# Patient Record
Sex: FEMALE | Race: AMERICAN INDIAN OR ALASKA NATIVE
[De-identification: names, ages, dates, MRNs, and addresses within clinical notes are randomized per-mention and may not be internally consistent; named-entity substitution may affect disease eponyms.]

---

## 2018-11-06 ENCOUNTER — HOSPITAL ENCOUNTER (OUTPATIENT)
Dept: HOSPITAL 46 - OPS | Age: 58
Discharge: HOME | End: 2018-11-06
Attending: SURGERY
Payer: COMMERCIAL

## 2018-11-06 VITALS — HEIGHT: 62 IN | WEIGHT: 164 LBS | BODY MASS INDEX: 30.18 KG/M2

## 2018-11-06 DIAGNOSIS — Z88.8: ICD-10-CM

## 2018-11-06 DIAGNOSIS — E11.9: ICD-10-CM

## 2018-11-06 DIAGNOSIS — J45.909: ICD-10-CM

## 2018-11-06 DIAGNOSIS — Z88.5: ICD-10-CM

## 2018-11-06 DIAGNOSIS — E66.3: ICD-10-CM

## 2018-11-06 DIAGNOSIS — K57.30: ICD-10-CM

## 2018-11-06 DIAGNOSIS — K62.5: Primary | ICD-10-CM

## 2018-11-06 DIAGNOSIS — Z91.041: ICD-10-CM

## 2018-11-06 DIAGNOSIS — E03.9: ICD-10-CM

## 2018-11-06 DIAGNOSIS — Z88.0: ICD-10-CM

## 2018-11-06 DIAGNOSIS — Z90.49: ICD-10-CM

## 2018-11-06 DIAGNOSIS — K21.9: ICD-10-CM

## 2018-11-06 PROCEDURE — 0DJD8ZZ INSPECTION OF LOWER INTESTINAL TRACT, VIA NATURAL OR ARTIFICIAL OPENING ENDOSCOPIC: ICD-10-PCS | Performed by: SURGERY

## 2018-11-06 PROCEDURE — G0500 MOD SEDAT ENDO SERVICE >5YRS: HCPCS

## 2018-11-06 NOTE — NUR
11/06/18 1421 Cheryl Mercer
1416-PATIENT ARRIVED TO PACU ON 2L NC O2 % PATIENT REACTIVE TO
VOICE DROWSY DENIES PAIN OR NAUSEA. ABDOMEN SOFT. LAYING LEFT LATERAL.
GLUCOSE CHECK 89

## 2018-11-07 NOTE — OR
[facility]
                                    [address], [place]  [address]
_________________________________________________________________________________________
                                                                 Signed   
 
 
DATE OF OPERATION:
11/06/2018
 
SURGEON:
Tk Larry MD
 
PREOPERATIVE DIAGNOSES:
1. Episodic rectal bleeding.
2. History of sigmoid resection for diverticular disease in 2011.
 
POSTOPERATIVE DIAGNOSES:
1. Scattered diverticula of remaining colon.  No evidence of anastomotic abnormality
including stricture. 
2. No active proctitis or obvious hemorrhoidal changes.
 
PROCEDURE:
Total colonoscopy to cecum.
 
ANESTHESIA:
Intravenous sedation, fentanyl 100 mcg, Versed 5 mg.
 
INDICATION:
This 58-year-old  woman has complaints of rectal bleeding.  She has last had
bleeding in May.  Blood filled the toilet bowl upon defecation.  She had no pain with
defecation.  She has undergone sigmoid resection for diverticular disease by me in 2011.
 She has some evidence of episodic abdominal pain as well.  She is admitted at this time
to undergo colonoscopy to better characterize her problem, understanding the risks of
bleeding, infection, and perforation. 
 
FINDINGS:
The prep was excellent.  Complete colonoscopy was undertaken of the cecum.  There were
few scattered diverticula in the remaining colon.  The rectum was normal.  The
anastomosis was widely patent.  There was no sign of active bleeding.  No sign of
proctitis or obvious hemorrhoidal changes. 
 
DESCRIPTION OF PROCEDURE:
The patient was brought to the endoscopy suite and placed in lateral decubitus position
given intravenous sedation to the point of slurred speech and nystagmus.  Digital rectal
examination was normal. 
 
An Olympus video colonoscope was passed in the rectum and manipulated throughout the
colon ultimately intubating the cecum.  The ileocecal valve and appendiceal orifice were
 
    Electronically Signed By: TK LARRY MD  11/07/18 0849
_________________________________________________________________________________________
PATIENT NAME:     JAMES VALENZUELA                 
MEDICAL RECORD #: [id number]            OPERATIVE REPORT              
          ACCT #: [id number]  
DATE OF BIRTH:   [birth year]            REPORT #: [id number]      
PHYSICIAN:        TK LARRY MD                 
PCP:              JOHN FERNANDO MD               
REPORT IS CONFIDENTIAL AND NOT TO BE RELEASED WITHOUT AUTHORIZATION
 
 
                                  [facility]
                                    [address], [place]  [address]
_________________________________________________________________________________________
                                                                 Signed   
 
 
normal.  Scope was withdrawn from that point.  Examination throughout showed no sign of
abnormality other than a few scattered diverticula throughout the colon.  The
coloproctostomy (anastomosis) was widely patent without sign of pathologic abnormality.
The rectum was normal.  Retroflexed view was undertaken showing no active bleeding or
obvious colitis.  No sign of significant hemorrhoidal change at this time.  Scope was
removed and the patient was taken to the recovery room in good condition. 
 
CONCLUDING DIAGNOSIS:
Uncertain etiology of bleeding probably hemorrhoidal, now passed. 
 
Recommend high-fiber diet or Citrucel 1 tablespoon daily.  She will call if bleeding
recurs. 
 
 
 
            ________________________________________
            MD GADIEL Mueller/SENGL
Job #:  [id number]
DD:  11/06/2018 14:22:21
DT:  11/06/2018 20:44:59
 
cc:            John Fernando MD
 
 
Copies:  JOHN FERNANDO MD
~
 
 
 
 
 
 
 
 
 
 
 
 
 
 
    Electronically Signed By: TK LARRY MD  11/07/18 0849
_________________________________________________________________________________________
PATIENT NAME:     JAMES VALENZUELA                 
MEDICAL RECORD #: [id number]            OPERATIVE REPORT              
          ACCT #: [id number]  
DATE OF BIRTH:   [birth year]            REPORT #: [id number]      
PHYSICIAN:        TK LARRY MD                 
PCP:              JOHN FERNANDO MD               
REPORT IS CONFIDENTIAL AND NOT TO BE RELEASED WITHOUT AUTHORIZATION

## 2021-06-25 NOTE — NUR
06/25/21 1015 Lesvia Louise
1012 PATIENT ARRIVES TO PACU RESTING WITH EYES CLOSED. ANSWERS
QUESTIONS APPROPRIATELY WITH VERBAL STIMULI. RESP EVEN AND
UNLABORED,OXYGEN OFF, ROOM AIR SATS >95%. DENIES PAIN OR NAUSEA.

## 2021-06-26 NOTE — OR
[facility]
                                    [address], [place]  [address]
_________________________________________________________________________________________
                                                                 Signed   
 
 
DATE OF OPERATION:
06/25/2021
 
SURGEON:
Tk Larry MD
 
PREOPERATIVE DIAGNOSES:
1. Abdominal bloating, fullness, longstanding gastroesophageal reflux history.
2. Diabetes mellitus.
3. History of diverticular disease, requiring resection.
 
POSTOPERATIVE DIAGNOSES:
1. Upper endoscopy showing normal esophagus, mild gastritis, no sign of ulceration or
neoplasm.  No hiatal hernia. 
2. Normal colon to cecum, widely patent colorectal anastomosis.
 
PROCEDURES:
1. Esophagogastroduodenoscopy with biopsy.
2. Total colonoscopy to cecum with biopsy of cecum and rectum.
 
ANESTHESIA:
Intravenous sedation, fentanyl 150 mcg and Versed 5 mg.
 
INDICATION:
This 61-year-old [place] woman is a patient of Dr. Cheney, [facility].  She is
known to me from the past having undergone sigmoid resection for diverticular disease.
She is seen recently with complaints of abdominal bloating and fullness and so on.  She
does not have typical biliary symptoms of epigastric right subcostal pain.  Her blood
sugars have been somewhat poorly controlled in recent times as well.  Clinical suspicion
remains high for possible biliary disease, a recent ct scan confirms gallstones. 
  She has also had lower abdominal pain, suggestive of recurrent diverticular
disease and upper abdominal pain in a bandlike configuration, which is uncertain
etiology.  Most dominantly, she has vague abdominal bloating and fullness.  She is
admitted at this time to undergo upper endoscopy and colonoscopy to better characterize
the problem, I remain highly suspect biliary source for symptoms. 
 
FINDINGS:
Upper endoscopy showed a normal esophagus.  This flap valve was good.  The stomach had
mild inflammation, but not much and no ulceration.  CLOtest was negative indicating no
evidence of H. pylori.  The duodenum was normal. 
 
Colonoscopy showed an adequate prep.  Complete colonoscopy was undertaken of the cecum.
 
    Electronically Signed By: TK LARRY MD  06/26/21 1149
_________________________________________________________________________________________
PATIENT NAME:     JAMES VALENZUELA                 
MEDICAL RECORD #: [id number]            OPERATIVE REPORT              
          ACCT #: [id number]  
DATE OF BIRTH:   [birth year]            REPORT #: [id number]      
PHYSICIAN:        TK LARRY MD                 
PCP:              ANDRES CHENEY MD                
REPORT IS CONFIDENTIAL AND NOT TO BE RELEASED WITHOUT AUTHORIZATION
 
 
                                  [facility]
                                    [address], [place]  [address]
_________________________________________________________________________________________
                                                                 Signed   
 
 
There were no gross findings of note.  The anastomosis was widely patent.  Biopsies were
taken of the cecum and rectum to assess for occult inflammation. 
 
DESCRIPTION OF PROCEDURE:
The patient was brought to the endoscopy suite, given topical hypopharyngeal lidocaine
anesthetic spray.  In the lateral decubitus position with full cardiopulmonary
monitoring.  A bite block was placed and she was given intravenous sedation to the point
of slurred speech and nystagmus.  The Olympus video upper endoscope was passed by
hypopharynx, vocal cords appeared normal.  Scope was easily passed in the esophagus
throughout its length, it was entirely normal.  Scope was passed to the stomach, which
was insufflated with air, there was no evidence of fluid retention or other problem,
there was no bile in the stomach, rugal folds were normal, pylorus was normal.  Scope
was passed through into the duodenum, which was normal; biopsies were taken of the 2nd
bulbar portions to assess for celiac disease and other occult inflammation.  The scope
was withdrawn.  A biopsy was then taken of the antrum for both JOE and pathologic
testing.  Notably, there was a fine reticular appearance of mild chronic gastritis.
Proximal evaluation on retroflexed view showed a reasonably good flap valve actually.
The scope was withdrawn to the distal esophagus and biopsies were obtained there.  The
mucosa was normal.  Scope was further withdrawn.  There were no other findings. 
 
Plans were then made for colonoscopy.  Additional sedation was given and digital rectal
examination was performed, which was normal.  An Olympus video colonoscope was passed in
the rectum and manipulated throughout the colon.  The prep was adequate, but not great
by any means.  There was no solid stool.  Fair amount of irrigation was required.  Scope
was ultimately advanced to the cecum.  The ileocecal valve and appendiceal orifice were
normal.  Scope was manipulated to allow for biopsy of the cecum.  Withdrawal of scope
showed no sign of abnormality throughout the entire colon.  The anastomosis which was
under site and configuration was widely patent.  Biopsies were then taken of the rectum,
but appeared normal.  The scope was removed.  The patient was taken to the recovery room
in good condition.  Notably, she did have internal hemorrhoidal changes. 
 
CONCLUDING DIAGNOSIS:
No findings likely to account for her current symptoms.  I strongly suspect a biliary
source to her problem. A Ct scan has already confirmed gallstones. I have scheduled her
for lap clarissa, possible open, this coming Tuesday-- barring significant findings on this
endoscopic evaluation. We Should continue with the opertive plan on that bais.
 
PLAN:
She has already been scheduled for lap clarissa, possible open procedure this coming
Tuesday and since there were no findings to account for her symptoms of upper abdominal
pain, we will procedure with lap clarissa. 
 
 
    Electronically Signed By: TK LARRY MD  06/26/21 1149
_________________________________________________________________________________________
PATIENT NAME:     JAMES VALENZUELA                 
MEDICAL RECORD #: [id number]            OPERATIVE REPORT              
          ACCT #: [id number]  
DATE OF BIRTH:   [birth year]            REPORT #: [id number]      
PHYSICIAN:        TK LARRY MD                 
PCP:              ANDRES CHENEY MD                
REPORT IS CONFIDENTIAL AND NOT TO BE RELEASED WITHOUT AUTHORIZATION
 
 
                                  [facility]
                                    [address] [place], [place]  [address]
_________________________________________________________________________________________
                                                                 Signed   
 
 
 
 
            ________________________________________
            MD GADIEL Mueller/TANIYA
Job #:  [id number]
DD:  06/25/2021 10:20:18
DT:  06/25/2021 17:37:33
 
cc:            Andres Cheney MD
 
 
Copies:  ANDRES CHENEY MD
~
 
 
 
 
 
 
 
 
 
 
 
 
 
 
 
 
 
 
 
 
 
 
 
 
 
 
 
    Electronically Signed By: TK LARRY MD  06/26/21 1149
_________________________________________________________________________________________
PATIENT NAME:     JAMES VALENZUELA                 
MEDICAL RECORD #: [id number]            OPERATIVE REPORT              
          ACCT #: [id number]  
DATE OF BIRTH:   [birth year]            REPORT #: [id number]      
PHYSICIAN:        TK LARRY MD                 
PCP:              ANDRES CHENEY MD                
REPORT IS CONFIDENTIAL AND NOT TO BE RELEASED WITHOUT AUTHORIZATION

## 2021-06-28 NOTE — PATH
[facility]
                                    [address], [place]  [address]
_________________________________________________________________________________________
                                                                 Signed   
 
 
 
SPECIMEN(S): A DUODENAL BIOPSY
SPECIMEN(S): B ANTRUM BIOPSY
SPECIMEN(S): C ESOPHAGEAL BIOPSY
SPECIMEN(S): D RECTUM
SPECIMEN(S): E CECUM COLON BIOPSY
 
SPECIMEN SOURCE:
A. DUODENAL BIOPSY
B. ANTRUM BIOPSY
C. ESOPHAGEAL BIOPSY
D. RECTUM
E. CECUM COLON BIOPSY
 
CLINICAL HISTORY:
Colonoscopy/EGD.  C/o N/V and chronic diarrhea.  History of diverticular 
disease.  DX: Mild gastritis. 
MICROSCOPIC DESCRIPTION:
Histologic sections of all submitted blocks are examined by light microscopy. 
These findings, together with the gross examination, support the pathologic 
diagnosis. 
Regarding specimen B:  Focal pancreatic acinar metaplasia is present, however 
no evidence of atrophic gastritis including atrophy, gland destruction, or 
full-thickeness lymphoplasmacytic infiltrate is 
seen.  An H. pylori immunohistochemical stain (with appropriately staining 
controls) is negative for Helicobacter organisms. 
NAL:cml
 
FINAL PATHOLOGIC DIAGNOSIS:
A.  Duodenum, biopsy:
-  Duodenal mucosa with mild Brunner's gland hyperplasia.
-  Negative for increased intraepithelial lymphocytes or villous blunting.
-  Negative for dysplasia or malignancy.
B.  Antrum, biopsy:
-  Antral mucosa with chronic, inactive gastritis and focal pancreatic acinar 
metaplasia. 
-  Negative for Helicobacter organisms (HE and IHC).
-  Negative for atrophy, dysplasia, or malignancy.
C.  Esophagus, biopsy:
-  Squamous mucosa with reactive changes and minimal chronic inflammation, 
consistent with reflux esophagitis. 
-  Negative for intestinal metaplasia, dysplasia, or malignancy.
 
                                                                                    
_________________________________________________________________________________________
PATIENT NAME:     JAMES VALENZUELA                 
MEDICAL RECORD #: [id number]            PATHOLOGY                     
          ACCT #: [id number]       ACCESSION #: [id number]     
DATE OF BIRTH:   [birth year]            REPORT #: [id number]       
PHYSICIAN:        FATMATA NAVA              
PCP:              ANDRES GUPTA MD                
REPORT IS CONFIDENTIAL AND NOT TO BE RELEASED WITHOUT AUTHORIZATION
 
 
                                  [facility]
                                    [address], [place]  [address]
_________________________________________________________________________________________
                                                                 Signed   
 
 
D.  Rectum, biopsy:
-  Rectal mucosa with no histopathologic abnormality.
-  Negative for active or chronic proctitis.
-  Negative for dysplasia or malignancy.
E.  Colon, cecum, biopsy:
-  Colonic mucosa with no histopathologic abnormality.
-  Negative for active or chronic proctitis.
-  Negative for dysplasia or malignancy.
NAL:cml:C2NR
 
GROSS DESCRIPTION:
Five specimens are received in five containers, labeled "TM."
A.  The specimen, labeled "TM, 1," and designated on the requisition 
"duodenum," is received in formalin and consists of four tan soft tissue 
fragments that measure 0.2-0.3 cm in greatest dimension. 
The specimen is entirely submitted in cassette (A1).
B.  The specimen, labeled "TM, 2," and designated on the requisition "antrum," 
is received in formalin and consists of two tan soft tissue fragments that 
measure 0.3 cm in greatest dimension.  The 
specimen is entirely submitted in cassette (B1).
C.  The specimen, labeled "TM, 3," and designated on the requisition 
"esophageal," is received in formalin and consists of two tan soft tissue 
fragments that measure 0.3 cm in greatest dimension.  The 
specimen is entirely submitted in cassette (C1).
 
D.  The specimen, labeled "TM, 4," and designated on the requisition "rectum," 
is received in formalin and consists of two tan soft tissue fragments that 
measure 0.3 cm in greatest dimension.  The 
specimen is entirely submitted in cassette (D1).
E.  The specimen, labeled "TM, 5," and designated on the requisition "cecum," 
is received in formalin and consists of three tan soft tissue fragments that 
measure 0.2-0.3 cm in greatest dimension. 
The specimen is entirely submitted in cassette (E1).
AT (under the direct supervision of a pathologist)
The Gross Description was prepared using a voice recognition system. The report 
was reviewed for accuracy; however, sound-alike word errors, addition and/or 
deletions may occur. If there is any 
question about this report, please contact Client Services.
 
ADDITIONAL NOTES:
Immunohistochemical and/or in situ hybridization studies were performed on this 
case with the appropriate positive controls that react as expected.  This test 
 
                                                                                    
_________________________________________________________________________________________
PATIENT NAME:     JAMES VALENZUELA                 
MEDICAL RECORD #: [id number]            PATHOLOGY                     
          ACCT #: [id number]       ACCESSION #: [id number]     
DATE OF BIRTH:   [birth year]            REPORT #: [id number]       
PHYSICIAN:        FATMATA NAVA              
PCP:              ANDRES GUPTA MD                
REPORT IS CONFIDENTIAL AND NOT TO BE RELEASED WITHOUT AUTHORIZATION
 
 
                                  [address]  [address]
_________________________________________________________________________________________
                                                                 Signed   
 
 
was developed and its performance 
characteristics determined by [facility].  It has not been cleared or 
approved by the U.S. Food and Drug Administration.  The FDA has determined that 
such clearance or approval is not 
necessary.  This test is used for clinical purposes.  It should not be regarded 
as investigational or for research.  [facility] is certified under the 
Clinical Laboratory Improvement 
Amendments of 1988 (CLIA) as qualified to perform high complexity clinical 
laboratory testing.  This assay has not been validated for specimens that have 
been decalcified. 
 
PERFORMING LABORATORY:
The technical component was performed by [facility], [address] [place] [address] (Medical Director: Mesha Cruz MD; CLIA# [id number]). 
[facility], [address] [place] [id number] (CLIA# 
[id number]). 
 
Diagnostician:  Cassandra Saxena MD
Pathologist
Electronically Signed 06/28/2021
 
 
Copies:                                
~
 
 
 
 
 
 
 
 
 
 
 
 
 
 
 
 
 
 
                                                                                    
_________________________________________________________________________________________
PATIENT NAME:     JAMES VALENZUELA                 
MEDICAL RECORD #: [id number]            PATHOLOGY                     
          ACCT #: [id number]       ACCESSION #: [id number]     
DATE OF BIRTH:   [birth year]            REPORT #: [id number]       
PHYSICIAN:        FATMATA NAVA              
PCP:              ANDRES GUPTA MD                
REPORT IS CONFIDENTIAL AND NOT TO BE RELEASED WITHOUT AUTHORIZATION

## 2021-06-29 NOTE — NUR
HEPARIN OVER RIDE SECONDARY TO PHARMACY TELLING NURSE WILL GET TO VERIFYING
MEDICATION WHEN THEY CAN. PROVIDED TO OR NURSE, PATIENT HEADING BACK TO OR.

## 2021-06-29 NOTE — NUR
06/29/21 [place] Cheryl Mercre
1023-PATIENT ARRIVED TO PACU ON 6L MASK RR EVEN ORAL AIRWAY IN PLACE
NONAROUSABLE. SR. IVF INFUSING. 4 LAP SITES TO ABDOMEN CDI.
 
1033-GLUCOSE CHECKED 235. PATIENT REMAINS NONAROUSABLE RR EVEN 6L MASK
ORAL AIRWAY REMAINS IN PLACE.

## 2021-06-29 NOTE — NUR
PATIENT REPORTS PAIN MEDICATION NOT EFFECTIVE AND REQUESTING IV TORADOL AND
SECOND PAIN PILL. CALL TO DR. LARRY WHO VERBALIZED ONE TIME DOSE OF IV TORADOL
30 MG. PATIENT VERIFIED NOT ALLERGIC TO IV TORADOL. UPDATED PATIENT ON POC.

## 2021-06-29 NOTE — NUR
REPOSITIONED PATIENT IN BED, PATIENT IN FETAL POSITION. STERI STRIPS TO
ABDOMEN HAVE NO NEW DRAINAGE. PATIENT RATING PAIN 7/10 ON PAIN SCALE.
ADMINISTERED PAIN MEDICATION. REPORTS PAIN AS SHARP. PROVIDED JELLO AND ICE
WATER. DIMMED LIGHTS. NO OTHER NEEDS AT THIS TIME.

## 2021-06-29 NOTE — NUR
PT BLOOD GLUCOSE CHECKED WITH GLUCOMETER 302. CALL TO DR LARRY TO ALLOW PT TO
MANAGE OWN BLOOD SUGER.

## 2021-06-29 NOTE — NUR
PATIENT TO DAYSURGERY BEDSIDE REPORT HEYDI MCKINNON. PATIENT APPEARS DROWSY,
REPORTS PAIN 5/10 ON PAIN SCALE SHARP. THEN PATINE FALLS BACK TO SLEEP. OXYGEN
SATURATION 97% 1.5 L NC. ENCOURAGED DEEP BREATHING. PATIENT SKIN WARM AND
DIAPHORETIC, BLOOD SUGAR 230, REMOVED BLANKETS, AFEBRILE. DRESSING TO ABDOMEN
STERI STRIPS TO PUNCTURE SITES X4. PROVIDED SNACKS, DISCUSSED POC FOR PAIN
CONTROL, CALL LIGHT WITHIN REACH.

## 2021-06-29 NOTE — NUR
PATIENT RESTING BACK IN BED WITH EYES CLOSED. VSS. PATIENT REPORTS PAIN
IMPROVED. STERI STRIPS INTACT, WITH SMALL AMOUNT OF RED DRAINAGE. PATIENT
REPORTS WILL GET UP IN A WHILE TO BATHROOM, WOULD LIKE TO REST MORE. CALL
LIGHT WITH IN REACH. HAND-OFF OF CARE TO BENOIT MCKINNON.

## 2021-06-29 NOTE — NUR
HAS BEEN SLEEPING AFTER PAIN MEDICINE. AWOKE SWEATY. UP TO BR  PT CHECKED
GLUCOSE WITH PHONE 317. WANTS SOME APPLESAUCE. RATES PAIN 2/10. SISTER CALLED
WONT BE HERE TILL 430. MARYAN LINK FOR GLUCOSE.

## 2021-06-30 NOTE — OR
[facility]
                                    [address], [place]  [address]
_________________________________________________________________________________________
                                                                 Signed   
 
 
DATE OF OPERATION:
06/29/2021
 
SURGEON:
Tk Larry MD
 
PREOPERATIVE DIAGNOSES:
1. Chronic calculous cholecystitis.
2. Diabetes mellitus.
3. Incisional hernia; history of sigmoid resection.
 
POSTOPERATIVE DIAGNOSES:
1. Chronic calculous cholecystitis with normal intraoperative cholangiogram.
2. Multiple intraabdominal adhesions related to omentum.
3. Incarcerated incisional hernia with incarcerated omentum.
 
PROCEDURES:
1. Laparoscopy with laparoscopic lysis of adhesions (complex prolonged).
2. Laparoscopic cholecystectomy with intraoperative cholangiogram.
3. Repair of incisional hernia (separate incision) without implantation of Prolene mesh.
4. Surgeon-directed fluoroscopy.
 
ANESTHESIA:
General endotracheal, Tavon Darwin, CRNA and local 20 mL of 0.25% Marcaine with
epinephrine. 
 
INDICATION:
This 61-year-old Nigerian woman is well known to me from the past.  She is currently a
patient of Dr. Cheney, [facility].  The patient has undergone sigmoid resection by
me a number of years ago for diverticular disease and other interventions including
hernia repair by Dr. Cespedes, hysterectomy, and other interventions.  She has underlying
hypothyroidism, diabetes mellitus, is overweight and has hypertension. 
 
In the past number of months, she has had vague abdominal pain, bloating, fullness, and
not feeling well.  A CT scan was performed in the direction of Dr. Cheney at [facility], which showed several gallstones, but also showed a hernia in the apex of the
midline incision.  Images on CT scan show colon or small bowel within it as well as some
omentum.  Clinically, the hernia defect is relatively large, probably 3 fingerbreadths,
but is seems to be partially reducible.  She additionally has right subcostal and
posterior thoracic pains, particularly after fatty food ingestion and I believe that her
gallstones are symptomatic.  She has recently undergone colonoscopy and upper endoscopy
by me showing no evidence of ulceration.  No sign of diverticulitis, neoplasm, or other
 
    Electronically Signed By: TK LARRY MD  06/30/21 1244
_________________________________________________________________________________________
PATIENT NAME:     JAMES VALENZUELA                 
MEDICAL RECORD #: [id number]            OPERATIVE REPORT              
          ACCT #: [id number]  
DATE OF BIRTH:   [birth year]            REPORT #: [id number]      
PHYSICIAN:        TK LARRY MD                 
PCP:              ANDRES CHENEY MD                
REPORT IS CONFIDENTIAL AND NOT TO BE RELEASED WITHOUT AUTHORIZATION
 
 
                                  [facility]
                                    [address], [place]  [address]
_________________________________________________________________________________________
                                                                 Signed   
 
 
problems.  Under the circumstances of her multifocal complaints of abdominal pain and
bloating and so forth, I have recommended cholecystectomy and if appropriate, concurrent
incisional hernia repair.  The risks of bleeding, infection, bile duct injury, need for
open procedure, and of course failure of the repair were all reviewed with her.  She
understands and wished to proceed. 
 
FINDINGS:
Hers was a complex case.  Opening of the supraumbilical incision to allow for
pneumoperitoneum was quite impossible as incarcerated omentum in the hernia defect would
not allow it.  On that basis entry to the abdomen was in the upper aspect.  Considerable
amount of intraabdominal adhesions were noted related to the omentum, which were taken
down with great care and fair amount of time to allow for entry to the abdomen to allow
for cholecystectomy itself. 
 
Cholecystectomy was performed safely.  The gallbladder was definitely chronically
inflamed.  There were multiple small dark stones. 
 
Cholangiogram was normal.  There was fatty infiltration of the liver, but no cirrhotic
changes. 
 
As regards to the hernia, it was repaired through separate incision reducing the omental
incarcerated hernia well and excising the hernia sac.  There was no incarcerated hollow
viscus.  As there was spillage of bile during the course of cholecystectomy, mesh was
deemed inadvisable to implant concurrently to the repair.  The repair was accomplished
with interrupted 0 Prolene sutures in a vertical mattress configuration.  Care was taken
to close an infraumbilical incision, which had been made (though ineffective) as well as
the epigastric port site. 
 
The operation was rather prolonged, complicated, and difficult lasting from
approximately 7:45 a.m. to 10:15 a.m. 
 
DESCRIPTION OF PROCEDURE:
The patient was brought to the operating room, given a general endotracheal anesthetic.
Preoperative antibiotic aztreonam was given.  Sequential compression device stockings
were used and heparin subcutaneously administered.  Sequential compression device
stockings were used as well.  After satisfactory general endotracheal anesthesia, the
abdomen was prepared with a chlorhexidine solution and draped sterilely.  Palpation in
the region of the umbilicus showed an incompletely reducible incisional hernia at the
apex of the low midline incision just above the umbilicus.  An incision was made
directly over this, anticipating easy reduction of the hernia and entry point for
abdominal pneumoperitoneum.  Despite efforts to do so and despite the fascia dividing
well, dense omental scarring to this area was noted and this was clearly not the area of
 
    Electronically Signed By: TK LARRY MD  06/30/21 1244
_________________________________________________________________________________________
PATIENT NAME:     JAMES VALENZUELA                 
MEDICAL RECORD #: [id number]            OPERATIVE REPORT              
          ACCT #: [id number]  
DATE OF BIRTH:   [birth year]            REPORT #: [id number]      
PHYSICIAN:        TK LARRY MD                 
PCP:              ANDRES CHENEY MD                
REPORT IS CONFIDENTIAL AND NOT TO BE RELEASED WITHOUT AUTHORIZATION
 
 
                                  [address]
                                  [place], [place]  [address]
_________________________________________________________________________________________
                                                                 Signed   
 
 
entry to the abdomen despite various maneuvers to do so.  An infraumbilical incision was
made outside the area of the hernia itself, but it too was densely adhered by omental
adhesions.  An epigastric incision was made to the right of the ligamentum teres
ultimately allowing for passage of a 5 mm trocar gently insinuated to allow for
pneumoperitoneum.  Leakage in the region of the previous interventions at the umbilicus
were secured with traumatic towel clamps.  The 10 mm angles laparoscope was changed out
for a 5 mm scope allowing for intraabdominal inspection.  There appeared to be
considerable omental adhesions certainly in the region of the umbilicus, thus precluding
safe entry to the abdomen through that site, but also in the upper abdomen and to the
right side as well.  A narrow window was identified on the right side for which a 5 mm
port could be placed and Endo Essie used to carefully brought in the area of access to
the abdominal cavity.  Omental adhesions were taken down primarily with sharp
dissection.  Ultimately, adhesions in the region of the umbilicus were taken down enough
that trocar could be changed for 10 mm one and the camera and laparoscope changed to 10
mm type.  This allowed for additional dissection of omental adhesions in the region of
the umbilicus.  They were densely adherent and it is no wonder that entry through the
site was not possible.  Ultimately, a Andrey cannula could be insinuated through this
incisional site and the balloon inflated associated with the trocar allowing for
replacement of the camera to that site. 
 
Additional 5 mm ports were placed in the right midclavicular area and further
adhesiolysis ultimately allowed for a window exposing well the upper abdomen.  A grasper
was used to elevate the apex of the gallbladder cephalad and omental adhesions were
taken down with blunt and sharp dissection.  Lateral retraction of the infundibulum
allowed for dissection of the triangle of Calot identifying well slightly enlarged
pericholecystic lymph node and the cystic duct itself.  The cystic duct was well
identified and once it was, a clip applied across the gallbladder cystic duct junction.
A transverse choledochotomy was made and the cystic duct allowing for egress of clear
yellow bile. 
 
Using the Cowan-type cholangiocatheter, intraoperative cholangiography was undertaken
showing free flow of contrast in the biliary tree with prompt emptying into the
duodenum.  There was no evidence of filling defect or abnormality in any way.  The
cystic duct was triply clamped and divided.  The gallbladder was dissected free in a
retrograde fashion using electrocautery.  A small rent was made in the gallbladder
allowing for egress of clear yellow bile.  This was suctioned free.  The gallbladder was
extracted through the epigastric port with an endobag, opened on the back table and
found to have several small black 4 mm gallstones.  The mucosa had chronic inflammatory
change with no neoplasm.  Irrigation was undertaken of the upper abdomen.  When excess
irrigation fluid was suctioned free, the subhepatic space once again examined closely
showing no sign of bile leak, bleeding, or other problem. 
 
 
    Electronically Signed By: TK LARRY MD  06/30/21 1244
_________________________________________________________________________________________
PATIENT NAME:     JAMES VALENZUELA                 
MEDICAL RECORD #: [id number]            OPERATIVE REPORT              
          ACCT #: [id number]  
DATE OF BIRTH:   [birth year]            REPORT #: [id number]      
PHYSICIAN:        TK LARRY MD                 
PCP:              ANDRES CHENEY MD                
REPORT IS CONFIDENTIAL AND NOT TO BE RELEASED WITHOUT AUTHORIZATION
 
 
                                  [address]  [address]
_________________________________________________________________________________________
                                                                 Signed   
 
 
Replacement of the laparoscope to the epigastric port allowed for better inspection in
the region of the incarcerated incisional hernia at the umbilicus.  There is no sign of
hollow viscus within this area.  The trocars were then removed under direct
visualization showing no sign of bleeding or other problems. 
 
Attention was turned towards the incisional hernia.  The fascial edges of the incisional
hernia were elevated and using blunt electrocautery dissection, the omental adhesions
and so forth were freed and replaced back into the abdominal cavity.  The hernia sac
associated with this defect was excised.  Implantation of Prolene mesh was deemed
inadvisable under the circumstances of bile contamination through the abdomen.  The
defect was at least 6 cm in size.  The fascia was reapproximated with interrupted 0
Prolene suture and interrupted vertical mattress configuration.  The infraumbilical
fascial defect was similarly reapproximated.  The epigastric defect was also secured
this way.  20 mL of 0.25% Marcaine with epinephrine was injected locally.  The skin was
closed with running subcuticular 3-0 Vicryl.  Steri-Strips were applied and an Acticoat
dressing applied to the umbilical area. 
 
The incisional hernia was repaired through separate incision from the operation itself.
The operation overall was complicated, prolonged, and difficult lasting 4 times longer 
than would be expected normally.  It was accomplished safely however.  Blood loss is
certainly less than 25 mL in aggregate. 
 
 
 
            ________________________________________
            MD GADIEL Mueller/TANIYA
Job #:  [id number]
DD:  06/29/2021 10:43:13
DT:  06/29/2021 12:38:17
 
cc:            Andres Cheney MD
 
 
Copies:  ANDRES CHENEY MD
~
 
 
 
 
 
    Electronically Signed By: TK LARRY MD  06/30/21 1244
_________________________________________________________________________________________
PATIENT NAME:     JAMES VALENZUELA                 
MEDICAL RECORD #: [id number]            OPERATIVE REPORT              
          ACCT #: [id number]  
DATE OF BIRTH:   [birth year]            REPORT #: [id number]      
PHYSICIAN:        TK LARRY MD                 
PCP:              ANDRES CHENEY MD                
REPORT IS CONFIDENTIAL AND NOT TO BE RELEASED WITHOUT AUTHORIZATION

## 2021-10-02 NOTE — XMS
PreManage Notification: JAMES VALENZUELA MRN:[id number]
 
Security Information
 
Security Events
No recent Security Events currently on file
 
 
 
[facility]IA MET
------------
[facility]
 
 
CARE PROVIDERS
There are no care providers on record at this time.
 
[facility] has no Care Guidelines for this patient.
 
YANI VISIT COUNT (12 MO.)
-------------------------------------------------------------------------------------
1 [facility]
-------------------------------------------------------------------------------------
TOTAL 1
-------------------------------------------------------------------------------------
NOTE: Visits indicate total known visits.
 
ED/C VISIT TRACKING (12 MO.)
-------------------------------------------------------------------------------------
10/02/2021 19:48
[facility]
 
TYPE: Emergency
 
COMPLAINT:
- DIFFICULTY BREATHING
-------------------------------------------------------------------------------------
 
 
INPATIENT VISIT TRACKING (12 MO.)
No inpatient visits to display in this time frame
 
[email or web address]

## 2021-10-03 NOTE — EKG
[facility]
                                    [address] [place]  [address]
_________________________________________________________________________________________
                                                                 Signed   
 
 
Sinus rhythm with marked sinus arrhythmia
Otherwise normal ECG
When compared with ECG of 28-JUN-2021 10:24,
No significant change was found
Confirmed by TAMIE DIXON MD ([id number]) on 10/3/2021 6:56:49 PM
 
 
 
 
 
 
 
 
 
 
 
 
 
 
 
 
 
 
 
 
 
 
 
 
 
 
 
 
 
 
 
 
 
 
 
 
 
 
 
 
    Electronically Signed By: TAMIE DIXON MD  10/03/21 1857
_________________________________________________________________________________________
PATIENT NAME:     JAMES VALENZUELA                 
MEDICAL RECORD #: [id number]                     Electrocardiogram             
          ACCT #: [id number]  
DATE OF BIRTH:   [birth year]                                       
PHYSICIAN:   TAMIE DIXON MD           REPORT #: [id number]
REPORT IS CONFIDENTIAL AND NOT TO BE RELEASED WITHOUT AUTHORIZATION

## 2022-01-17 NOTE — XMS
PreManage Notification: JAMES VALENZUELA MRN:[id number]
 
Security Information
 
Security Events
No recent Security Events currently on file
 
 
 
CRITERIA MET
------------
- [facility]
 
 
CARE PROVIDERS
-------------------------------------------------------------------------------------
[facility]     10/04/2021-[facility]
 
PHONE: [phone]
-------------------------------------------------------------------------------------
 
[facility] has no Care Guidelines for this patient.
Care History
Medical/Surgical
10/04/2021    [facility]
- PATIENT IS [facility] ELIGIBLE, \T\middot;\T\nbsp; PLEASE REFER PATIENT TO 
      [facility] FOR NON EMERGENT MEDICAL NEEDS. \T\middot;\T\nbsp;
      [facility] CAN SEE PATIENTS SAME DAY FOR APTS IF PATIENT CALLS FIRST
      THING IN THE MORNING.
E.D. VISIT COUNT (12 MO.)
-------------------------------------------------------------------------------------
2 [facility]
-------------------------------------------------------------------------------------
TOTAL 2
-------------------------------------------------------------------------------------
NOTE: Visits indicate total known visits.
 
ED/UCC VISIT TRACKING (12 MO.)
-------------------------------------------------------------------------------------
01/17/2022 12:13
[facility]
 
TYPE: Emergency
 
COMPLAINT:
- CHEST PAIN
-------------------------------------------------------------------------------------
10/02/2021 19:48
[facility]
 
TYPE: Emergency
 
COMPLAINT:
- DIFFICULTY BREATHING
 
DIAGNOSES:
- Allergy status to narcotic agent
- Allergy status to other antibiotic agents
 
- Allergy status to analgesic agent
- Hypothyroidism, unspecified
- Allergy status to other drugs, medicaments and biological substances
- Allergy status to penicillin
- Other long term (current) drug therapy
- COVID-19
- Allergy to seafood
- Unspecified asthma, uncomplicated
- Long term (current) use of insulin
-------------------------------------------------------------------------------------
 
 
INPATIENT VISIT TRACKING (12 MO.)
No inpatient visits to display in this time frame
 
[email or web address]

## 2022-01-20 NOTE — EKG
[facility]
                                    [address] [place], [place]  [address]
_________________________________________________________________________________________
                                                                 Signed   
 
 
Normal sinus rhythm
Normal ECG
When compared with ECG of 02-OCT-2021 20:10,
No significant change was found
Confirmed by TAMIE DIXON MD ([id number]) on 1/20/2022 11:24:48 AM
 
 
 
 
 
 
 
 
 
 
 
 
 
 
 
 
 
 
 
 
 
 
 
 
 
 
 
 
 
 
 
 
 
 
 
 
 
 
 
 
    Electronically Signed By: TAMIE DIXON MD  01/20/22 1125
_________________________________________________________________________________________
PATIENT NAME:     JAMES VALENZUELA                 
MEDICAL RECORD #: [id number]                     Electrocardiogram             
          ACCT #: [id number]  
DATE OF BIRTH:   [birth year]                                       
PHYSICIAN:   TAMIE DIXON MD           REPORT #: [id number]
REPORT IS CONFIDENTIAL AND NOT TO BE RELEASED WITHOUT AUTHORIZATION

## 2022-08-12 NOTE — NUR
PT C/O ABD PAIN. "I HAVE A HIGH TOLERANCE TO PAIN, THIS DOES NOT DO ANYTHING
FOR ME", I NEED AN ANTIEMETIC WITH THE DILAUDID, TOO. PT MEDICATED WITH
DILAUDID 0.5MG IV PER 5/10 ABD PAIN, PT RECEIVED ZOFRAN IN ED. PT INSTRUCTED
ON TIMES WHEN MEDS ARE DUE. REASSURED.  IVF INFUSING W/O PROBLEMS, NO C/O
ADVERSE REACTION TO ABX. CALL LIGHT AT HANDS REACH, DOES OWN
ORAL CARE,  FAMILY AT BEDSIDE

## 2022-08-12 NOTE — NUR
TO PT ROOM FOR VS. VS WNL. IV FLUIDS RUNNING. NG TUBE TO SUCTION WITH NO
OUTPUT. VISITOR AT BEDSIDE. CALL LIGHT WITHIN REACH. RATES PAIN 0/10 ATT.

## 2022-08-12 NOTE — NUR
0545 - PT ARRIVED FROM ED VIA STRETCHER, COOPERATIVE WITH ADMISSION QUESTIONS,
AWARE OF NPO STATUS. ANXIOUS, REASSURED, IVF INFUSING . ORAL CARE DONE
BY SELF.

## 2022-08-12 NOTE — NUR
PT CONTINUES TO VOMIT DESPITE ZOFRAN. PHENERGAN ADMINISTERED. IV ABX RUNNING.
CALL LIGHT WITHIN REACH.

## 2022-08-12 NOTE — NUR
NG TUBE PLACEMENT ATTEMPTED X1 TO RIGHT NOSTRIL. SECOND ATTEMPT BY CHARGE
NURSE. BOTH UNSUCCESSFUL. OR NOTIFIED.

## 2022-08-12 NOTE — NUR
DR GALLO AT RN STATION, VERBAL ORDER READ BACK FOR HOSPITALIST CONSULT FOR
DIABETES MANAGEMENT AS pt IS CURRENTLY DIABETIC AND NPO.

## 2022-08-12 NOTE — NUR
PT REPORTING NAUSEA AND VOMITING X 1 STATES VOMIT WAS BROWN IN COLOR. REPORTS
PAIN 4/10. REQUESTING PAIN MEDICATION.

## 2022-08-12 NOTE — NUR
08/12/22 1446 Lesvia Louise
144 PATIENT ARRIVES TO PACU UNRESPONSIVE TO PAIN. ORAL AIRWAY IN
PLACE. RESP EVEN AND UNLABORED, MASK AT 6 LITERS. NG LEFT NOSTRIL
IN PLACE, ATTACHED TO LOW SUCTION.

## 2022-08-12 NOTE — NUR
PT RETURNED FROM SURGERY. FOLLOWS COMMANDS BUT SLEEPY. NG TUBE AT LOW
INTERMITTENT SUCTION. IV FLUIDS RUNNING . NEW MONTIEL CATHETERY IN PLACE.
INCISION SITE CDI. PT DENIES PAIN ATT. CALL LIGHT WITHIN REACH.

## 2022-08-12 NOTE — NUR
DR GALLO HERE, NEW VERBAL ORDERS RECEIVED TO INCREAE DILAUDID  0.5MG TO 1.5MG
Q2H PRN ABD PAIN. AND ACCUCHECKS Q6H AS SHE IS NPO, DR DE LA TORRE CONSULT TO MANAGE
DM

## 2022-08-12 NOTE — NUR
CALLED DR GALLO REGARDING NO OUTPUT FROM NG TUBE. GAVE OK TO DO XRAY FOR
PLACEMENT VERIFICATION AND THROAT LOZENGES.

## 2022-08-12 NOTE — NUR
At approximately 1930, patient alerted this nurse that she wanted NG tube
removed. This nurse explained that Dr. Cespedes wanted NG tube to stay in order
to rest bowels. Dr. Cespedes notified. 1mg IV ativan q4hr PRN ordered. Upon
retreiving ativan at approximately 1945
and re-entering patient room, patient refused ativan. Patient stated that if
this nurse did not pull NG tube, she would, and then leave AMA. This nurse
notified charge nurse Jennifer of patient's behavior. NG tube pulled by this
nurse. NG tubing intact. Removed without complication.
Dr. Cespedes notified at approximately 1950. Dr. Cespedes did not give any
new order at that time.

## 2022-08-13 NOTE — NUR
PATIEBT ASSESMENT COMPLETED. PATIENTS VITALS TAKEN AND RECORDED. INTAKE AND
OUTPUT RECORDED. PM MEDS GIVEN PER ORDER. PATIENT REPORTS 3/10 ABD PAIN, PRN
PAIN MEDICATION GIVEN PER ORDER. ICE PACK PROVIDED FOR PATIENTS ABD. PATIENT
DENIES ANY NAUSEA. PATIENTS IV INFUSING PER ORDER. PATIENTS MID ABD INCISION
IS C/D/I, STAPLES PRESENT, WELL APPROX, NO DRAINAGE NOTED, AND NO S/SX OF
INFECTION NOTED. ACTIVE BOWEL TONES. PATIENT IS PASSING GAS. FRESH ICE WATER
PROVIDED. NO FURTHER NEEDS NOTED. CALL LIGHT IN REACH.

## 2022-08-13 NOTE — OR
[facility]
                                    [address], [place]  [address]
_________________________________________________________________________________________
                                                                 Signed   
 
 
DATE OF OPERATION:
08/12/2022
 
SURGEON:
Blaze Gallo MD
 
PREOPERATIVE DIAGNOSIS:
Recurrent supraumbilical incarcerated incisional hernia (3 cm).
 
POSTOPERATIVE DIAGNOSIS:
Recurrent supraumbilical incarcerated incisional hernia (3 cm).
 
PROCEDURE:
Primary repair of recurrent supraumbilical incarcerated incisional hernia.
 
ESTIMATED BLOOD LOSS:
None.
 
INDICATIONS:
Manolo is a 62-year-old diabetic female, who had a previous periumbilical midline incision
for a sigmoid resection regarding her diverticular disease.  She has also had a
Pfannenstiel incision for hysterectomy.  Last summer, she underwent a combination of a
laparoscopic lysis of adhesions and cholecystectomy along with primary closure of her
periumbilical incisional hernia with Dr. Anderson.  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
moves  pound hay hakeem every day.  Her sister told me that Manolo was aware that she
had a recurrent supraumbilical incisional hernia.  Two or three days ago while lifting
hay, she felt pain and swelling in that area.  She then developed nausea and vomiting.
She came to the emergency room for evaluation.  I was called at about 4 a.m. this
morning.  White count was normal.  CT scan showed the fascial defect around 2.6 cm.
There contained a knuckle of small bowel representing a Smith's hernia.  We gave her
Levaquin and Flagyl and admitted her overnight.  They were able to pass the NG tube this
morning on our medical floor.  She had vomited this morning about 1300 mL of additional
fluid.  I met with Manolo this morning and I reviewed the above findings with her as well
as her previous surgical records.  I explained to Manolo that our goal is to get her out
of trouble today and not necessary to do a large incisional hernia repair with mesh.  We
also were concerned that the bowel may or may not be viable.  We had discussed the
expected intraop and postop course.  There is risk to the surgery including, but not
limited to bleeding, infection, scarring, change in contour of the skin, damage to
bowel, anastomotic leak, recurrent incisional hernias along with chronic pain.  She had
expressed understanding and wished to proceed. 
 
PROCEDURE NOTE:
 
    Electronically Signed By: BLAZE GALLO MD  08/13/22 0814
_________________________________________________________________________________________
PATIENT NAME:     MANOLO VALENZUELA                 
MEDICAL RECORD #: [id number]            OPERATIVE REPORT              
          ACCT #: [id number]  
DATE OF BIRTH:   [birth year]            REPORT #: [id number]      
PHYSICIAN:        BLAZE GALLO MD             
PCP:              ANDRES GUPTA MD                
REPORT IS CONFIDENTIAL AND NOT TO BE RELEASED WITHOUT AUTHORIZATION
 
 
                                  [facility]
                                    [address] [place] [address]  [address]
_________________________________________________________________________________________
                                                                 Signed   
 
 
Manolo was brought down to our preop area.  After answering her questions once again, we
took her into the operating room and placed her in the supine position.  She was given
general endotracheal tube anesthesia.  She was on her preoperative antibiotics along
with Lovenox.  SCDs were in place.  A Hall catheter was inserted with return of clear
yellow urine.  She was prepped and draped in the usual sterile fashion.  We really could
not feel an obvious fascial defect due to her body habitus.  We utilized her previous
periumbilical incision and carried that down through the tissue bluntly with the
cautery.  We opened up the hernia sac and we found that the small bowel had been reduced
but it appeared viable.  We excised the entire hernia sac.  Realized just a few
adhesions on the left side.  We then closed the defect transversely with interrupted #1
Prolene figure-of-eight sutures.  Injected local anesthetic into the abdominal wall.
The wound was irrigated and suctioned out until clear.  We brought the subcutaneous
tissue together with interrupted 2-0 PDS sutures.  The dermis was brought together with
interrupted 3-0 subcuticular Monocryl sutures.  The stitches were reapproximated with
staples.  Dry gauze and tape were then applied.  We were able to place the NG tube
during the surgery while she was asleep.  Of course, I could not reach the stomach
through that small hole.  We left the Hall catheter in place.  She was awakened from
her anesthesia, extubated in the OR, and taken to recovery room in stable condition. 
 
 
 
            ________________________________________
            Blaze Gallo MD 
 
 
ALB/MODL
Job #:  [id number]
DD:  08/12/2022 14:51:41
DT:  08/12/2022 15:26:48
 
cc:            MD Andres Murdock MD
 
 
Copies:  BLAZE GALLO MD, JAMES MD
~
 
 
 
 
 
    Electronically Signed By: BLAZE GALLO MD  08/13/22 0814
_________________________________________________________________________________________
PATIENT NAME:     MANOLO VALENZUELA                 
MEDICAL RECORD #: [id number]            OPERATIVE REPORT              
          ACCT #: [id number]  
DATE OF BIRTH:   [birth year]            REPORT #: [id number]      
PHYSICIAN:        BLAZE GALLO MD             
PCP:              ANDRES GUPTA MD                
REPORT IS CONFIDENTIAL AND NOT TO BE RELEASED WITHOUT AUTHORIZATION

## 2022-08-13 NOTE — NUR
TORADOL GIVEN FOR HA AND ABDOMINAL PAIN. ENCOURAGED AMBULATION. PT REFUSES
ATT. IV FLUIDS RUNNING. CALL LIGHT WITHIN REACH.

## 2022-08-13 NOTE — NUR
PT AMBULATED IN THE [place] WITH NURSING STAFF. PT STEADY AND BALANCED. C/O MILD
ABDOMINAL PAIN WITH WALKING. BILATERAL LOWER QUADRANTS HAVE ACTIVE BOWEL
SOUNDS. INCISION SITE IS WELL APPROXIMATED WITHOUT DRAINAGE. HAS BEEN UP TO
VOID X 1 SINCE CATHETER REMOVAL. NO BM BUT IS PASSING FLATULANCE.

## 2022-08-13 NOTE — NUR
Patient resting in bed. Denies pain. Denies nausea. Claims to feel hot. Oral
temp of 99.0F. Adequate amount of olvera output noted. Vitals stable. No
drainage noted on abdominal dressing.
Patient has remained in bed. Call light
within reach.

## 2022-08-13 NOTE — NUR
RECEIVED REPORT FROM DAY SHIFT RN. PATIENT ASSISTED TO THE BR AS A SBA.
PATIENT ABLE TO VOID. PATIENT BACK RESTING ON EDGE OF BED. PATIENT IS
REQUESTING A CHEESEBURGER. EDUCATED PATIENT ON DIET. PATIENT STATED "HOW AM I
SUPPOSED TO SHIT IF I DONT EAT". EDUCATED PATIENT ON PLAN OF CARE AND DIET.
PATIENT STATED "I WILL NOT STAY HERE UNTIL I SHIT, I AM LEAVING [place]".
EDUCATED PATIENT THAT MD WILL ROUND IN AM AND THEY CAN DISCUSS DISCHARGE THEN.
PATIENT PROVIDED DIET PEPSI, JELLO AND Romanian ICE. PATIENT STATED "THIS IS
RIDICULOUS I AM STARVING". PROVIDED SUPPORT TO PATIENT AND REINFORCED DIET
ORDER. PATIENT DENIES ANY PAIN OR NAUSEA. NO FURTHER NEEDS NOTED. CALL LIGHT
IN REACH.

## 2022-08-13 NOTE — NUR
After NG tube was removed, patient had uneventful shift. Pt compliant with
treatments and attitude is much more cooperative. Fully alert and oriented.
Calls appropriately. On RA. Lungs sounds clear bilat. Bowel sounds active.
Patient claims to be passing flatus. Denies nausea. No BM on shift. Hall
catheter remains in place. Adequate output. Remains NPO. Infusing maintanence
fluid at rate of 125. Has remained in bed. CPOX in use.

## 2022-08-13 NOTE — NUR
IV ALARMING. IV INFUSING PER ORDER. PATIENT DENIES ANY PAIN OR NAUSEA NO
FURTHER NEEDS NOTED. CALL LIGHT IN REACH.

## 2022-08-13 NOTE — NUR
PATIENT IN BED RESTING, AND VISITING WITH FAMILY. VITALS AND I/O'S COMPLETED.
NO OTHER NEEDS AT THIS TIME. CALL LIGHT WITHIN REACH.

## 2022-08-13 NOTE — NUR
MONTIEL CATHETER REMOVED. PT SITTING UP IN BED SIPPING WATER AND JELLO.
ENCOURAGED AMBULATION TODAY. CALL LIGHT WITHIN REACH.

## 2022-08-13 NOTE — NUR
TO PT ROOM FOR MORNING ASSESSMENT AND MEDICATION ADMINISTRATION. PT A/O,
REQUESTING MONTIEL CATHETER TO BE REMOVED. PT AMBULATED BEFORE SHIFT AND SAT IN
CHAIR. IV ABX RUNNING. CALL LIGHT WITHIN REACH. C/O HA 5/10 GIVEN DILAUDID.

## 2022-08-13 NOTE — NUR
PATIENT IN BED, REFUSING TO GET UP, MONTIEL EMPTIED, VITALS AND I/O'S COMPLETED.
NO OTHER NEEDS AT THIS TIME. CALL LIGHT WITHIN REACH.

## 2022-08-13 NOTE — NUR
PATIENT ASSISTED TO REPOSITION IN BED. PATIENT DENIES ANY PAIN OR NAUSEA.
FRESH ICE WATER PROVIDED. NO FURTHER NEEDS NOTED. CALL LIGHT IN REACH.

## 2022-08-13 NOTE — NUR
PATIENT ASSISTED TO THE BR AS A SBA. PATIENT ABLE TO VOID. PATIENT IS BACK IN
BED RESTING. PATIENT DENIES ANY PAIN OR NASUEA. IV INFUSING PER ORDER. NO
FURTHER NEEDS NOTED. CALL LIGHT IN REACH.

## 2022-08-13 NOTE — NUR
PT SITTING AT EOB. IV FLUIDS RUNNING. PT STATES SHE IS FEELING VERY HUNGRY.
GIVEN BROTH AND JELLO. PT UP TO VOID. CALL LIGHT WITHIN REACH.

## 2022-08-13 NOTE — CONS
[facility]
                                    [address], [place]  [address]
_________________________________________________________________________________________
                                                                 Signed   
 
 
DATE OF CONSULTATION:
08/12/2022
 
CHIEF COMPLAINT:
Periumbilical abdominal pain with nausea and vomiting.
 
HISTORY OF PRESENT ILLNESS:
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
she cares for.  She has to lift the hay and so forth.  She had been through multiple
previous abdominal surgeries.  Last summer she went through a transverse right subcostal
incision for an open cholecystectomy as well as a periumbilical midline incision with
Dr. Anderson for repair of incisional hernia.  She has had a previous periumbilical
incision for her diverticulitis and sigmoid resection years ago.  Dr. Anderson generally
uses Prolene mesh in the retroperitoneal space.  She thinks the last two days or so she
has been having pain and swelling just above the umbilicus.  She was having nausea,
vomiting, and dehydration.  She came to emergency room for evaluation last night.  White
count was normal.  The CT scan showed a 26 mm hernia just above the umbilicus with
incarcerated small bowel representing the transition point.  Her stomach was completely
full of fluid and she vomited up about 1300 mL earlier this morning.  I had talked to
the ER doctor last night about placing an NG tube, but he did not feel strongly in that
regard.  We are going to go ahead and place that now.  She does have multiple allergies.
 Consequently, she received Levaquin and Flagyl last night.  She has been hydrated as
well.  We have consulted our Internal Medicine Service, but our hospitalist is extremely
busy currently.  I have been busy this morning operating and was coming around to Manolo
at this time. 
 
PAST MEDICAL HISTORY:
Non-insulin-dependent diabetes, hypothyroidism, asthma, chronic back and hip pain,
diverticulitis, multiple hernias including a left femoral hernia and a periumbilical
incisional hernia as well as endometriosis. 
 
PAST SURGICAL HISTORY:
Includes an open cholecystectomy, sigmoid resection for diverticular disease, left
femoral hernia repair per myself, hysterectomy, sinus surgery, left knee surgery L5 and
periumbilical incisional hernia with Prolene mesh last year with Dr. Anderson. 
 
SOCIAL HISTORY:
She does not smoke.  She has a drink once in a while.  She is single and has no
children.  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 Her sister is
Destiny Wilson at [phone].  Dr. Andres Cheney is her primary care provider. 
 
FAMILY HISTORY:
None.
 
 
    Electronically Signed By: BLAZE GALLO MD  08/13/22 0814
_________________________________________________________________________________________
PATIENT NAME:     MANOLO VALENZUELA                 
MEDICAL RECORD #: [id number]            CONSULTATION                  
          ACCT #: [id number]  
DATE OF BIRTH:   [birth year]            REPORT #: [id number]      
PHYSICIAN:        BLAZE GALLO MD             
PCP:              ANDRES CHENEY MD                
REPORT IS CONFIDENTIAL AND NOT TO BE RELEASED WITHOUT AUTHORIZATION
 
 
                                  [address]  [address]
_________________________________________________________________________________________
                                                                 Signed   
 
 
REVIEW OF SYSTEMS:
She had 10 systems reviewed and she told me about her hernia surgeries.
 
ALLERGIES:
1. Talwin.
2. Penicillin.
3. Cephalosporin.
4. Cefuroxime.
5. Metformin.
6. Rosuvastatin.
7. Morphine.
8. Ibuprofen.
 
MEDICATIONS:
1. Tylenol.
2. Levothyroxine 50 mcg p.o. daily.
3. Estradiol.
4. Albuterol.
5. Fluticasone.
6. Prevacid.
7. Vitamin D.
8. Multivitamin.
9. Magnesium oxide.
10. Epinephrine __________.
11. Gabapentin.
12. Insulin.
13. Lisinopril.
14. Etodolac.
15. Detrol LA.
16. Glipizide __________.
17. Calcium.
18. Claritin.
19. Diclofenac.
20. Trulicity.
21. Meclizine.
22. Phentermine.
 
PHYSICAL EXAMINATION:
VITAL SIGNS:  Blood pressure 131/64, heart rate 80, respiratory rate 18, temperature is
97.6 degrees, she is 96% on room air. She is 5 feet 2 inches at 83 kg. 
GENERAL:  Manolo is a 62-year-old female, lying supine in her hospital bed.  She is alert,
awake, and interactive.  One can see that she has some pain around the umbilicus. 
LUNGS:  Clear to auscultation bilaterally. 
HEART:  Regular rate and rhythm without murmurs. 
 
    Electronically Signed By: BLAZE GALLO MD  08/13/22 0814
_________________________________________________________________________________________
PATIENT NAME:     MANOLO VALENZUELA                 
MEDICAL RECORD #: [id number]            CONSULTATION                  
          ACCT #: [id number]  
DATE OF BIRTH:   [birth year]            REPORT #: [id number]      
PHYSICIAN:        BLAZE GALLO MD             
PCP:              ADNRES CHENEY MD                
REPORT IS CONFIDENTIAL AND NOT TO BE RELEASED WITHOUT AUTHORIZATION
 
 
                                  [address]  [address]
_________________________________________________________________________________________
                                                                 Signed   
 
 
ABDOMEN:  Generally soft and flat, but I can feel a tender lump just above the
umbilicus.  No change in skin color. 
 
LABORATORY DATA:
Her white blood cell count is 9.9, hemoglobin 14, neutrophils 55.  Blood sugar is 165.
Electrolytes are unremarkable. COVID negative. Blood sugar was 193 this morning.  Liver
function tests are negative. Albumin is 3.4, lipase negative at 70. 
 
RADIOGRAPHIC STUDIES:
CT scan of the abdomen and pelvis is reviewed and she can easily see the hernia just
above the umbilicus containing small bowel.  It measures out about 26 mm. 
 
ASSESSMENT AND PLAN:
Manolo is a 62-year-old female with incarcerated recurrent supraumbilical incisional
hernia.  She has been admitted, hydrated and given IV antibiotics.  I have reviewed with
her the current findings in detail.  She has had multiple surgeries and she is very
aware of this whole process.  There is risk including, but not limited to bleeding,
infection, scarring, change in contour of the skin, damage to bowel, possible need for
bowel resection with anastomotic leak, recurrent incisional hernias, chronic pain and
other unforeseen comorbidities.  She has expressed understanding and would like to
proceed with surgery. 
 
 
 
            ________________________________________
            Blaze Gallo MD 
 
 
ALB/MODL
Job #:  [id number]
DD:  08/12/2022 11:31:45
DT:  08/12/2022 12:42:53
 
cc:            MD Andres Murdock MD
 
 
Copies:  BLAZE GALLO MD, JAMES MD
~
 
 
 
 
    Electronically Signed By: BLAZE GALLO MD  08/13/22 0814
_________________________________________________________________________________________
PATIENT NAME:     MANOLO VALENZUELA                 
MEDICAL RECORD #: [id number]            CONSULTATION                  
          ACCT #: [id number]  
DATE OF BIRTH:   [birth year]            REPORT #: [id number]      
PHYSICIAN:        BLAZE GALLO MD             
PCP:              ANDRES CHENEY MD                
REPORT IS CONFIDENTIAL AND NOT TO BE RELEASED WITHOUT AUTHORIZATION

## 2022-08-13 NOTE — NUR
TO PT ROOM TO CHECK ON PT. PT DENIES NEED FOR PAIN MEDICATION ATT. PT IS A/O,
CALL LIGHT WITHIN REACH.

## 2022-08-14 NOTE — NUR
RECEIVED REPORT ON PT. PT A/O, SITTING UP IN BED. IV FLUIDS RUNNING. PT HAD
GOOD URINE OUTPUT OVERNIGHT. CALL LIGHT WITHIN REACH.

## 2022-08-14 NOTE — NUR
PATIENT UP TO BR AS A SBA. PATIENT ABLE TO VOID. PATIENT IS BACK IN BED
RESTING. PATIENT DENIES ANY PAIN OR NAUSEA. NO FURTHER NEEDS NOTED. CALL LIGHT
IN REACH.

## 2022-08-14 NOTE — EKG
[facility]
                                    [address] [facility]
                                  [place], [place]  [address]
_________________________________________________________________________________________
                                                                 Signed   
 
 
Normal sinus rhythm
Normal ECG
When compared with ECG of 17-JAN-2022 12:17,
QT has lengthened
Confirmed by ELIAZAR DE LA TORRE MD ([id number]) on 8/14/2022 7:09:58 AM
 
 
 
 
 
 
 
 
 
 
 
 
 
 
 
 
 
 
 
 
 
 
 
 
 
 
 
 
 
 
 
 
 
 
 
 
 
 
 
 
    Electronically Signed By: ELIAZAR DE LA TORRE MD  08/14/22 0710
_________________________________________________________________________________________
PATIENT NAME:     JAMES VALENZUELA                 
MEDICAL RECORD #: [id number]                     Electrocardiogram             
          ACCT #: [id number]  
DATE OF BIRTH:   [birth year]                                       
PHYSICIAN:   ELIAZAR DE LA TORRE MD                   REPORT #: [id number]
REPORT IS CONFIDENTIAL AND NOT TO BE RELEASED WITHOUT AUTHORIZATION

## 2022-08-14 NOTE — NUR
PATIENT UP TP BR AND ABLE TO VOID. PATIENT IS BACK IN BED RESTING. VITALS
TAKEN AND RECORDED. INTAKE AND OUTPUT RECORDED. PATIENT RATES PAIN AT A 2/10,
ICE PACK PROVIDED AND Pt DENIES THE NEED FOR PRN MEDS AT THIS TIME. PATIENT
DENIES ANY NAUSEA. PATIENTS MID ABD INCISION IS C/D/I, STAPLES PRESENT, WELL
APPROX, AND NO S/SX OF INFECTION NOTED. ACTIVE BOWEL TONES NOTED IN ALL 4
QUADRANTS. FRESH ICE WATER PROVIDED. NO FURTHER NEEDS NOTED. CALL LIGHT IN
REACH.

## 2022-08-14 NOTE — NUR
PT DISCHARGED. A/O, AMBULATING WELL. HAD BOWEL MOVEMENT JUST PRIOR TO
DISCHARGE. VOIDING WELL. IV REMOVED, CATHETER INTACT. TRANSPORTED IN
WHEELCHAIR WITH NURSING STAFF TO [facility].

## 2022-08-15 NOTE — DS
[facility]
                                    [address] [place] [address], [place]  [address]
_________________________________________________________________________________________
                                                                 Signed   
 
 
ADMISSION DATE:  08/12/2022
 
DISCHARGE DATE:  08/14/2022
 
FINAL DIAGNOSIS:
Recurrent supraumbilical incisional hernia (3 cm).
 
PROCEDURE:
Primary repair of recurrent supraumbilical incarcerated incisional hernia without mesh.
 
INDICATIONS:
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
She continues to lift large hakeem of hay, weighing anywhere from 65 to 100 pounds.  She
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  She had a sigmoid resection
years ago through a periumbilical midline incision with Dr. Anderson.  Last summer, she
came for a laparoscopic cholecystectomy with Dr. Anderson.  At that time, he primarily
repaired a periumbilical incisional hernia with Prolene suture.  Overall, Manolo has done
well.  However, she has noticed for some time according to her sister that she has a
recurrent supraumbilical incisional hernia.  A couple of days ago, she felt like the
intestine was stuck in that area.  She finally came to the emergency room for
evaluation.  In the emergency room, she was having nausea and vomiting.  White count was
normal.  CT scan showed the 26 mm hernia.  There was a piece of small bowel representing
a Smith's hernia.  I had been asked to admit her as a general surgeon on-call. 
 
HOSPITAL COURSE:
Manolo was admitted as above and started on her Levaquin and Flagyl.  We were not
successful in passing the NG tube.  However, Manolo was not particularly interested in the
NG tube.  She did vomit in the morning and I think she emptied her stomach in that
regard.  It was difficult to appreciate the hernia because of her body habitus.  I
explained to Manolo I thought it was imperative we take her to the OR.  We took her that
same day and we were able to lyse a few adhesions and move the small bowel away from her
hernia.  We closed that hernia defect transversely with interrupted #1 Prolene
figure-of-eight sutures.  We kept her in the hospital after the surgery as well as
yesterday.  She has been on a clear liquid diet and IV fluids.  She is now passing
flatus.  She has had no nausea or vomiting.  Abdominal exam is completely benign.  She
has had frequent headaches, which is usual for her.  She said the Toradol seems to work
well in that regard.  She also told me that she has taken hydrocodone in the past for
surgical pain.  At this point, we are going to be discharging her to home.  She told me
she already has family and friends taking care of the horses. 
 
DISCHARGE PLANS AND MEDICATIONS:
Manolo is going to be discharged to home with a prescription for Norco 10/325 one tablet
 
    Electronically Signed By: BLAZE GALLO MD  08/15/22 2010
_________________________________________________________________________________________
PATIENT NAME:     MANOLO VALENZUELA                 
MEDICAL RECORD #: [id number]            DISCHARGE SUMMARY             
          ACCT #: [id number]  
DATE OF BIRTH:   [birth year]            REPORT #: [id number]      
PHYSICIAN:        BLAZE GALLO MD             
PCP:              ANDRES GUPTA MD                
REPORT IS CONFIDENTIAL AND NOT TO BE RELEASED WITHOUT AUTHORIZATION
 
 
                                  [address]  [address]
_________________________________________________________________________________________
                                                                 Signed   
 
 
p.o. q.6 hours p.r.n. for pain.  We will dispense 30 tablets with no refills.  She will
resume her chronic medications at home.  She is going to advance her diet slowly.  She
knows to cut back on the diabetic medications when her diet has been cut back.  We will
leave the incision open to air and staples in place.  She will shower and bathe as
usual.  She is not to do any heavy pushing, pulling, or lifting over about 20 pounds for
a couple of months until this heals.  She has been encouraged to arrange for additional
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  Her frequent heavy lifting puts
her at increased risk for recurrent incisional hernia along with her diabetes and
obesity.  I will see her back in my office in about 7 to 10 days for followup.  She has
expressed understanding and agrees with the above plan. 
 
 
 
            ________________________________________
            Blaze Gallo MD 
 
 
ALB/MODL
Job #:  [id number]
DD:  08/14/2022 08:41:45
DT:  08/14/2022 09:52:16
 
cc:            MD Blaze Peña MD
 
 
Copies:  ANDRES GUPTA MD, ANDREW L MD
~
 
 
 
 
 
 
 
 
 
 
 
 
 
    Electronically Signed By: BLAZE GALLO MD  08/15/22 2010
_________________________________________________________________________________________
PATIENT NAME:     MANOLO VALENZUELA                 
MEDICAL RECORD #: [id number]            DISCHARGE SUMMARY             
          ACCT #: [id number]  
DATE OF BIRTH:   [birth year]            REPORT #: [id number]      
PHYSICIAN:        BLAZE GALLO MD             
PCP:              ANDRES GUPTA MD                
REPORT IS CONFIDENTIAL AND NOT TO BE RELEASED WITHOUT AUTHORIZATION

## 2023-01-23 NOTE — XMS
PreManage Notification: JAMES VALENZUELA MRN:[id number]
 
Security Information
 
Security Events
No recent Security Events currently on file
 
 
 
CRITERIA MET
------------
- [facility]
 
 
CARE PROVIDERS
-------------------------------------------------------------------------------------
[facility]     10/04/2021-[facility]
 
PHONE: [phone]
-------------------------------------------------------------------------------------
 
Bertin has no Care Guidelines for this patient.
Care History
Medical/Surgical
10/04/2021    [facility]
- PATIENT IS [facility] ELIGIBLE, \T\middot;\T\nbsp; PLEASE REFER PATIENT TO 
      [facility] FOR NON EMERGENT MEDICAL NEEDS. \T\middot;\T\nbsp;
      [facility] CAN SEE PATIENTS SAME DAY FOR APTS IF PATIENT CALLS FIRST
      THING IN THE MORNING.
E.D. VISIT COUNT (12 MO.)
-------------------------------------------------------------------------------------
2 [facility]
-------------------------------------------------------------------------------------
TOTAL 2
-------------------------------------------------------------------------------------
NOTE: Visits indicate total known visits.
 
ED/UCC VISIT TRACKING (12 MO.)
-------------------------------------------------------------------------------------
01/23/2023 12:29
[facility]
 
TYPE: Emergency
 
COMPLAINT:
- CHEST PAIN
-------------------------------------------------------------------------------------
08/12/2022 01:44
[facility]
 
TYPE: Emergency
 
COMPLAINT:
- ABD PAIN
-------------------------------------------------------------------------------------
 
 
INPATIENT VISIT TRACKING (12 MO.)
 
-------------------------------------------------------------------------------------
08/12/2022 09:37
[facility]
 
TYPE: Medical Surgical
 
COMPLAINT:
- INCARCERATED VENTRAL HERNIA
 
DIAGNOSES:
- Allergy status to penicillin
- Allergy status to penicillin
- Long term (current) use of insulin
- Umbilical hernia with obstruction, without gangrene
- Body mass index [BMI] 33.0-33.9, adult
- Allergy status to other antibiotic agents
- Unspecified asthma, uncomplicated
- Other long term (current) drug therapy
- Acquired absence of other specified parts of digestive tract
- Other chronic pain
- Other long term (current) drug therapy
- Allergy status to narcotic agent
- Long term (current) use of insulin
- Allergy status to narcotic agent
- Umbilical hernia with obstruction, without gangrene
- Unspecified abdominal pain
- Allergy status to other drugs, medicaments and biological substances
- Hypothyroidism, unspecified
- Other chronic pain
- Obesity, unspecified
- Acquired absence of both cervix and uterus
- Hypothyroidism, unspecified
- Type 2 diabetes mellitus without complications
- Contact with and (suspected) exposure to COVID-19
- Type 2 diabetes mellitus without complications
- Unspecified asthma, uncomplicated
- Allergy status to other antibiotic agents
- Obesity, unspecified
- Acquired absence of both cervix and uterus
- Allergy status to other drugs, medicaments and biological substances
- Body mass index [BMI] 33.0-33.9, adult
- Contact with and (suspected) exposure to COVID-19
- Acquired absence of other specified parts of digestive tract
-------------------------------------------------------------------------------------
 
[email or web address]

## 2023-01-24 NOTE — EKG
[facility]
                                    [address]
                                  [place], [place]  [address]
_________________________________________________________________________________________
                                                                 Signed   
 
 
Normal sinus rhythm
Normal ECG
No previous ECGs available
Confirmed by ELIAZAR DE LA TORRE MD ([id number]) on 1/24/2023 10:06:27 PM
 
 
 
 
 
 
 
 
 
 
 
 
 
 
 
 
 
 
 
 
 
 
 
 
 
 
 
 
 
 
 
 
 
 
 
 
 
 
 
 
 
    Electronically Signed By: ELIAZAR DE LA TORRE MD  01/24/23 2206
_________________________________________________________________________________________
PATIENT NAME:     JAMES VALENZUELA                 
MEDICAL RECORD #: [id number]                     Electrocardiogram             
          ACCT #: [id number]  
DATE OF BIRTH:   [birth year]                                       
PHYSICIAN:   ELIAZAR DE LA TORRE MD                   REPORT #: [id number]
REPORT IS CONFIDENTIAL AND NOT TO BE RELEASED WITHOUT AUTHORIZATION

## 2024-08-30 NOTE — PATH
[facility] Department of Urogynecology   Tahmina Mahoney MD, MPH   [phone]    ASSESSMENT AND PLAN:   34 y.o. female presenting in the [facility] clinic following 3b perineal laceration s/p vaginal delivery on 7/5/24.        Diagnoses:   #1 3b perineal laceration   #2 Postpartum anxiety   #3 LLQ pain   #4 PP eclampsia    Plan:   1. 3b perineal laceration   - She is healing well, but endorses some soreness with sutures that will improve once they dissolve at 3 months.   - Patient can return to intercourse in one month. In the meantime, work with PFPT. Use a silicone based lubricant (uberlube)  - discussed low estrogen from breastfeeding.     2. LLQ pain  - LLQ pain is likely related to constipation or a muscle spasm. If her bowels are regular, she should continue with PFPT.     3. Postpartum anxiety, lack of sleep   - Patient can pump every 4 hours at night instead of every 3 hours to improve sleep.   - Counseled her on the importance of taking care of herself, particularly because her  takes care of the baby at night and this will allow her an opportunity for rest.   - Continue with Dr. Isamar Gill to manage postpartum anxiety after her traumatic experience with eclampsia.      Follow-up in 6 months with Dr. Mahoney.     Scribe Attestation:   I, Odilia Medina, am scribing for virtually, and in the presence of Tahmina Mahoney MD MPH on 8/30/24 at 2:36 PM.     Problem List Items Addressed This Visit       Eclampsia ([facility]-HCC)    Anxiety     Other Visit Diagnoses       Type 3b perineal laceration ([facility]-H[facility])    -  Primary           I spent a total of 30 minutes in face to face and non face to face time.      Tahmina Mahoney MD, MPH, FACOG     Established     HISTORY OF PRESENT ILLNESS:     Rex Murphy is a 34 y.o. female who presents for follow up after 3b perineal laceration.     Record Review:   - 8/21/24 Neurology (Dr. Burgess) note: Continue seizure precautions and restrictions during maternity leave  [facility]
                                    [address], [place]  [address]
_________________________________________________________________________________________
                                                                 Signed   
 
 
 
SPECIMEN(S): A GALLBLADDER AND STONES
SPECIMEN(S): B HERNIA SAC
 
SPECIMEN SOURCE:
A. GALLBLADDER AND STONES
B. HERNIA SAC
 
CLINICAL HISTORY:
Laparoscopic cholecystectomy.  Biliary disease.
 
FINAL PATHOLOGIC DIAGNOSIS:
A.  Gallbladder, cholecystectomy:
-  Chronic cholecystitis with cholesterolosis.
-  Cholelithiasis.
B.  Hernia sac, excision:
-  Fragments of fibromembranous tissue with attached fibroadipose tissue, 
consistent with hernia sac. 
NAL:cml:C2NR
 
MICROSCOPIC EXAMINATION:
Histologic sections of all submitted blocks are examined by light microscopy.  
These findings, together with the gross examination, support the pathologic 
diagnosis. 
 
GROSS DESCRIPTION:
Two specimens are received in two containers, labeled "TM."
A.  The specimen, labeled "TM, gallbladder," is received in formalin and 
consists of 
Specimen: Previously opened gallbladder.
Dimensions: 7 cm in length and 4.2 cm in inner circumference.
Serosa: Violaceous and smooth.
Cystic Duct: Unobstructed.
Calculi: Several black gallstones within the container that measure 0.1-0.2 cm 
in greatest dimension. 
Mucosa: Pink-tan and velvety.
Wall thickness: 0.3 cm.
Lymph node: No pericystic lymph nodes are grossly identified.
Additional: None.
Representative sections are submitted in cassette (A1).
B.  The specimen, labeled "TM, hernia sac," is received in formalin and 
consists of two irregular shaped, fibromembranous tissues with attached adipose 
 
                                                                                    
_________________________________________________________________________________________
PATIENT NAME:     JAMES VALENZUELA                 
MEDICAL RECORD #: [id number]            PATHOLOGY                     
          ACCT #: [id number]       ACCESSION #: [id number]     
DATE OF BIRTH:   [birth year]            REPORT #: [id number]       
PHYSICIAN:        FATMATA NAVA              
PCP:              ANDRES GUPTA MD                
REPORT IS CONFIDENTIAL AND NOT TO BE RELEASED WITHOUT AUTHORIZATION
 
 
                                  [facility]
                                    [address] [address]
_________________________________________________________________________________________
                                                                 Signed   
 
 
tissue that aggregate measures 3.7 x 4.2 x 1.2 
cm.  Specimen is pink-tan, focally congested.  Sectioning through the specimen 
is grossly unremarkable.  Representative sections are submitted in cassette 
(B1). 
 
JS (under the direct supervision of a pathologist)
The Gross Description was prepared using a voice recognition system. The report 
was reviewed for accuracy; however, sound-alike word errors, addition and/or 
deletions may occur. If there is any 
question about this report, please contact Client Services.
 
PERFORMING LABORATORY:
The technical component was performed by [facility] [address] (Medical Director: Mesha Cruz MD; CLIA# [id number]). 
Professional interpretation was performed by 
[facility], [address] [address] (CLIA# [id number]). 
 
Diagnostician:  Cassandra Saxena MD
Pathologist
Electronically Signed 07/01/2021
 
 
Copies:                                
~
 
 
 
 
 
 
 
 
 
 
 
 
 
 
 
 
 
 
                                                                                    
_________________________________________________________________________________________
PATIENT NAME:     JAMES VALENZUELA                 
MEDICAL RECORD #: [id number]            PATHOLOGY                     
          ACCT #: [id number]       ACCESSION #: [id number]     
DATE OF BIRTH:   [birth year]            REPORT #: [id number]       
PHYSICIAN:        FATMATA NAVA              
PCP:              ANDRES GUPTA MD                
REPORT IS CONFIDENTIAL AND NOT TO BE RELEASED WITHOUT AUTHORIZATION (planned to continue through October). Check in near the end of her maternity leave to see if there are any concerns for recurrence. Record with phone video any future events to review and help establish a diagnosis. Go to the ED if any future events with loss of consciousness or concerns for injury. Follow-up as needed.   - 8/10/24 ED note: PMH hx of presumed eclamptic seizure presents with similar prodrome of R hand spasms and felling of impending doom.   - 24 Dr. Tahmina Mahoney note reviewed:  3b perineal laceration - well healing incision, referred to PFPT. She is breastfeeding. Trimmed suture material to help with itching and irritation. Sent rx for Triamcinolone 0.1% ointment. Given list of vulvar care measures. Swab taken to rule out BV and vaginitis swab was negative. Referred to Isamar Gill for anxiety.   - 24 Dr. Mahoney note:  3b perineal laceration - superficial separation at posterior introitus and some swelling, use Sitz bath, ice packs, donut pillows, and Proctofoam. Take Ibuprofen and Tylenol for pain. Rx: Augmentin. Referred to PFPT. Referred to [facility] for anxiety. Follows with OB for eclampsia. She is breastfeeding and baby had a tongue tie,   - 24 Discharge Summary for Eclampsia: Now postpartum day #8 from [facility], readmitted with persistent severe range blood pressures. Patient had previously had  on , complicated by 3b lac, had 1 mild range blood pressure. Was routinely discharged home on , then on 7/10 represented after a suspected eclamptic seizure at [facility]. Received 24 hrs pp Mg, was normotensive, did not require PO BP meds. Had HA that resolved, MRI/A/V and EEG and neuro consult, negative. Prior HELLP labs n/f mild transaminitis that resolved prior to dc.  7/10, s/p normal echo . Patient then represented this admission on  with severe range Bps requiring IV treatment with hydral 5, was started on nifed 30 and a course of lasix. On  7/13 was normotensive, asymptomatic, and stable for discharge home with return precautions.     Postpartum Symptoms:  - She has been staying at her parents for the past month and her  has been helping with the baby at night.   - Sleeps about 3 hours continuously and gets up because she pumps.   - She hears her son when he wakes up also.   - Pt gets anxious when her son makes a gagging noise at night or spits up.   - During the day, her baby feeds every 1.5-2 hours. She reports her baby holds his breath when he doesn't feed at this interval.   - She has spoken to Dr. Isamar Gill.      Pelvic Symptoms:   - She went to PFPT and started working with them.   - Denies bowel or bladder issues.   - Endorses lower L sided pain.   - She did have pain with sitting on the toilet which has now resolved.   - Occasionally she will feel discomfort at the laceration site.     Interval History:   - She is overall feeling well, however she has felt anxious and recently went to the ED for prodrome of R hand spasms. The ED told her to not hold her baby or go to the bathroom alone. Pt then saw neurology. The hand spasms only happened for one day.        Past Medical History:     Past Medical History:   Diagnosis Date    Syncope and collapse 09/02/2021    Near syncope          Past Surgical History:     Past Surgical History:   Procedure Laterality Date    OTHER SURGICAL HISTORY  09/02/2021    No history of surgery         Medications:     Prior to Admission medications    Medication Sig Start Date End Date Taking? Authorizing Provider   NIFEdipine ER (Adalat CC) 30 mg 24 hr tablet Take 1 tablet (30 mg) by mouth once every 24 hours. Do not crush, chew, or split. 7/13/24   Yamilet Mott MD        ROS  Review of Systems       PHYSICAL EXAM:    LMP 10/05/2023 (Exact Date) Comment: Breastmilk and formula  Patient's last menstrual period was 10/05/2023 (exact date).    Declines chaperone for physical exam.      Well developed, well  nourished, in no apparent distress.   Neurologic/Psychiatric:  Awake, Alert and Oriented times 3.  Affect normal.     GENITAL/URINARY:     External Genitalia:  The patient has normal appearing external genitalia, normal skenes and bartholins glands, and a normal hair distribution.  Her vulva is without lesions, erythema or discharge.  It is non-tender with appropriate sensation. Soreness at suture site.     Urethral Meatus:  Size normal, Location normal, Lesions absent, Prolapse absent.    Urethra:  Fullness absent, Masses absent.    Bladder:  Fullness absent, Masses absent, Tenderness absent.    Vagina:  General appearance normal, Discharge absent, Lesions absent. Well healing. No prolapse.     Anus/Perineum:  Lesions absent and Masses absent defer exam      Physical Exam  Genitourinary:   POP-Q measurements were:      Aa: Ba: C:      gH: 2.5, pB: 3, TVL:     Ap: -2, Bp: -2, D:          The patient has 4 out of 5 pelvic floor muscle strength.        Data and DIAGNOSTIC STUDIES REVIEWED   No results found.   Lab Results   Component Value Date    URINECULTURE No significant growth 07/09/2024      Lab Results   Component Value Date    GLUCOSE 92 08/10/2024    CALCIUM 9.4 08/10/2024     08/10/2024    K 3.9 08/10/2024    CO2 25 08/10/2024     08/10/2024    BUN 14 08/10/2024    CREATININE 0.52 08/10/2024     Lab Results   Component Value Date    WBC 5.9 08/10/2024    HGB 12.6 08/10/2024    HCT 37.6 08/10/2024    MCV 87 08/10/2024     08/10/2024

## 2025-03-11 NOTE — NUR
03/11/25 1227 Sheets,Flakita
1219 PT ARRIVED TO PACU ON 3L VIA NC, PT WAKES EASILY AND EASILY FALLS
BACK TO SLEEP. RESP EVEN AND UNLABORED. VSS.

## 2025-03-13 NOTE — PATH
[facility]
                                    [address], [place]  [address]
_________________________________________________________________________________________
                                                                 Signed   
 
 
 
SPECIMEN(S): A DUODENAL BIOPSY
SPECIMEN(S): B ANTRUM BIOPSY
SPECIMEN(S): C GASTRIC POLYP
SPECIMEN(S): D GE JUNCTION BIOPSY
SPECIMEN(S): E MIDDLE ESOPHAGUS BIOPSY
 
SPECIMEN SOURCE:
A. DUODENAL BIOPSY
B. ANTRUM BIOPSY
C. GASTRIC POLYP
D. GE JUNCTION BIOPSY
E. MIDDLE ESOPHAGUS BIOPSY
 
CLINICAL HISTORY:
History of abdominal bloating, GERD.  Post: Anterior gastritis
 
FINAL PATHOLOGIC DIAGNOSIS:
A. Duodenum, biopsy:
-  Duodenal mucosa with no significant pathologic changes
B. Stomach, antrum, biopsy:
-  Gastric antral mucosa with reactive gastropathy
-  Negative for Helicobacter pylori with HE stains
C. Stomach, polypectomy:
-  Fundic gland polyp
D. Gastroesophageal junction, biopsy:
-  Esophageal squamous and gastric oxyntic type mucosa with no significant 
pathologic changes; negative for intestinal metaplasia 
E. Esophagus, middle, biopsy:
-  Esophageal squamous mucosa with active Candida associated esophagitis
BRP
 
MICROSCOPIC EXAMINATION:
Histologic sections of all submitted blocks are examined by light microscopy.  
These findings, together with the gross examination, support the pathologic 
diagnosis. 
 
GROSS DESCRIPTION:
A. The specimen, labeled and designated "BEBE Valenzuela, duodenal biopsy," is 
received in formalin and consists of one tan soft tissue fragment, 0.4 cm. 
Entirely submitted in (A1). 
B. The specimen, labeled and designated "BEBE Valenzuela, antral biopsy," is 
 
                                                                                    
_________________________________________________________________________________________
PATIENT NAME:     JAMES VALENZUELA                 
MEDICAL RECORD #: [id number]            PATHOLOGY                     
          ACCT #: [id number]       ACCESSION #: [id number]     
DATE OF BIRTH:   [birth year]            REPORT #: [id number]       
PHYSICIAN:        FATMATA NAVA              
PCP:              ANDRES GUPTA MD                
REPORT IS CONFIDENTIAL AND NOT TO BE RELEASED WITHOUT AUTHORIZATION
 
 
                                  [facility]
                                    [address], [place]  [address]
_________________________________________________________________________________________
                                                                 Signed   
 
 
received in formalin and consists of two tan soft tissue fragments, ranging 
from 0.4-0.5 cm. Entirely submitted in (B1). 
C. The specimen, labeled and designated "Minthorn, T, gastric polyp," is 
received in formalin and consists of one tan soft tissue fragment, 0.4 cm. 
Entirely submitted in (C1). 
D. The specimen, labeled and designated "Minthorn, T, GE junction biopsy," is 
received in formalin and consists of five tan soft tissue fragments, ranging 
from 0.1-0.6 cm. Entirely submitted in (D1). 
E. The specimen, labeled and designated "Minthorn, T, middle esophagus biopsy," 
is received in formalin and consists of four tan soft tissue fragments, ranging 
from 0.1-0.7 cm. Entirely submitted in 
(E1).
 
AB  (under the direct supervision of a pathologist)
The Gross Description was prepared using a voice recognition system. The report 
was reviewed for accuracy; however, sound-alike word errors, addition and/or 
deletions may occur. If there is any 
question about this report, please contact Client Services.
 
ADDITIONAL NOTES:
Immunohistochemical and/or in situ hybridization studies if performed in this 
case included appropriate positive controls that reacted as expected.  This 
test was developed and its performance 
characteristics determined by [facility].  It has not been cleared or 
approved by the U.S. Food and Drug Administration.  The FDA has determined that 
such clearance or approval is not 
necessary.  This test is used for clinical purposes.  It should not be regarded 
as investigational or for research.  [facility] is certified under the 
Clinical Laboratory Improvement 
Amendments of 1988 (CLIA) as qualified to perform high complexity clinical 
laboratory testing. 
 
PERFORMING LABORATORY:
Technical component was performed by [facility], [address] [place] [address] (CLIA# [id number]). Professional interpretation was 
performed by [facility] - [facility], [address] [place] [address] (CLIA#: [id number]).
 
Diagnostician:  Robert Wade MD
Pathologist
Electronically Signed 03/13/2025
 
 
                                                                                    
_________________________________________________________________________________________
PATIENT NAME:     JAMES VALENZUELA                 
MEDICAL RECORD #: [id number]            PATHOLOGY                     
          ACCT #: [id number]       ACCESSION #: [id number]     
DATE OF BIRTH:   [birth year]            REPORT #: [id number]       
PHYSICIAN:        FATMATA PATHOLOGY              
PCP:              ANDRES GUPTA MD                
REPORT IS CONFIDENTIAL AND NOT TO BE RELEASED WITHOUT AUTHORIZATION
 
 
                                  [address]
                                  [place]  [address]
_________________________________________________________________________________________
                                                                 Signed   
 
 
Copies:                                
~
 
 
 
 
 
 
 
 
 
 
 
 
 
 
 
 
 
 
 
 
 
 
 
 
 
 
 
 
 
 
 
 
 
 
 
 
 
 
 
 
 
                                                                                    
_________________________________________________________________________________________
PATIENT NAME:     JAMES VALENZUELA                 
MEDICAL RECORD #: [id number]            PATHOLOGY                     
          ACCT #: [id number]       ACCESSION #: [id number]     
DATE OF BIRTH:   [birth year]            REPORT #: [id number]       
PHYSICIAN:        FATMATA NAVA              
PCP:              ANDRES GUPTA MD                
REPORT IS CONFIDENTIAL AND NOT TO BE RELEASED WITHOUT AUTHORIZATION

## 2025-03-16 NOTE — OR
[facility]
                                    [address], [place]  [address]
_________________________________________________________________________________________
                                                                 Signed   
 
 
DATE OF OPERATION:
03/11/2025
 
SURGEON:
Tk Larry MD
 
PREOPERATIVE DIAGNOSIS:
Generalized abdominal bloating and epigastric pain; recent discontinuance of Wegovy.
 
POSTOPERATIVE DIAGNOSIS:
Antral gastritis without ulceration.
 
PROCEDURE:
Esophagogastroduodenoscopy with biopsy.
 
ANESTHESIA:
Intravenous sedation, fentanyl 100 mcg and Versed 5 mg.
 
INDICATION:
This 65-year-old  woman is a patient of Dr. Cheney and more recently Dr. Fernando
at [facility].  She has had complaints of upper abdominal pain and epigastric
pain as well as "bad reflux."  By this she means regurgitation as well as vomiting and
burning epigastric pain.  She feels that there is nothing "moving through" her digestive
tract.  A plain abdominal x-ray showed a fair amount of stool burden.  She has been on
Mounjaro for the past three months.  Quite notably, she was on Ozempic a few years ago
which she had similar symptoms and was completely not tolerant of it.  A GI cocktail was
prescribed by Dr. Fernando at [facility], which was beneficial to her.  She is
now taking a PPI medication.  It is my impression she most likely has adverse side
effects including probable dysmotility or gastroparesis related to the Wegovy.  I have
advised her to discontinue it entirely.  She is admitted at this time to undergo upper
endoscopy to better characterize her symptoms specific to assess for peptic ulcer as she
has been on etodolac, nonsteroidal for arthritic problems.  She is advised to stop all
NSAIDs at this time as well.  She understands the risk of upper endoscopy including, but
not limited to bleeding, infection, and perforation and wishes to proceed. 
 
FINDINGS:
Esophagus and duodenum appeared normal.  Stomach did have antral gastritis.  The flap
valve was good.  CLOtest was negative.  There were no actual ulcerations, though she had
antral gastritis. 
 
DESCRIPTION OF PROCEDURE:
The patient was brought to the endoscopy suite and placed in lateral decubitus position
 
    Electronically Signed By: TK LARRY MD  03/16/25 0747
_________________________________________________________________________________________
PATIENT NAME:     JAMES VALENZUELA                 
MEDICAL RECORD #: [id number]            OPERATIVE REPORT              
          ACCT #: [id number]  
DATE OF BIRTH:   [birth year]            REPORT #: [id number]      
PHYSICIAN:        TK LARRY MD                 
PCP:              ANDRES CHENEY MD                
REPORT IS CONFIDENTIAL AND NOT TO BE RELEASED WITHOUT AUTHORIZATION
 
 
                                  [facility]
                                    [address] [place] [address]  [id number]
_________________________________________________________________________________________
                                                                 Signed   
 
 
after undergoing lidocaine hypopharyngeal anesthesia.  She was given intravenous
sedation to the point of slurred speech and nystagmus with full cardiopulmonary
monitoring.  A bite block was placed.  An Olympus video upper endoscope was passed into
the hypopharynx.  The vocal cords were normal.  Esophagus throughout its length was
entirely normal.  Scope was passed in the stomach which was insufflated with air.  There
was some amount of bilious fluid within it, but not much and some semi-solid material,
but again not much.  Certainly, one would have expected this to have cleared and
therefore she may have an underlying gastric atony to some degree.  Stomach was not
massively dilated.  The pylorus was normal.  Scope was passed through into the duodenum.
 The duodenum was normal.  Biopsies were obtained.  The scope was withdrawn and biopsies
then taken of the antrum.  She had a few gastric polyps, likely related to PPI use.  One
representative polyp was excised.  Retroflexed view was undertaken showing a normal flap
valve and persistent findings of mild gastritis more proximally.  The scope was
withdrawn to the distal esophagus where biopsies were obtained though it appeared normal
and the midesophagus as well.  The scope was removed.  The patient was taken to the
recovery room in good condition. 
 
CONCLUDING DIAGNOSIS:
Antral gastritis, mild without sign of gastric outlet obstruction.  There is no evidence
of hiatal hernia and no sign of esophagitis.  I think it is highly probable that the
adverse effects of gastric dysmotility related to her Wegovy may be the ultimate cause
of her symptoms.  I am recommending she continue to avoid Wegovy at this time.  Continue
her PPI medication.  We will add Carafate 1 g p.o. q.i.d.  We will see her back in the
office in 4-6 weeks and assess her progress. 
 
 
 
            ________________________________________
            MD GADIEL Mueller/SENGL
Job #:  [id number]
DD:  03/11/2025 12:28:42
DT:  03/11/2025 15:27:41
 
cc:            MD John Peña MD
 
 
Copies:  ANDRES CHENEY MD
 
    Electronically Signed By: TK LARRY MD  03/16/25 0747
_________________________________________________________________________________________
PATIENT NAME:     JAMES VALENZUELA                 
MEDICAL RECORD #: [id number]            OPERATIVE REPORT              
          ACCT #: [id number]  
DATE OF BIRTH:   [birth year]            REPORT #: [id number]      
PHYSICIAN:        TK LARRY MD                 
PCP:              ANDRES HCENEY MD                
REPORT IS CONFIDENTIAL AND NOT TO BE RELEASED WITHOUT AUTHORIZATION
 
 
                                  [address]  [address]
_________________________________________________________________________________________
                                                                 Signed   
 
 
         JOHN FERNANDO MD
~
 
 
 
 
 
 
 
 
 
 
 
 
 
 
 
 
 
 
 
 
 
 
 
 
 
 
 
 
 
 
 
 
 
 
 
 
 
 
 
 
 
    Electronically Signed By: TK LARRY MD  03/16/25 0747
_________________________________________________________________________________________
PATIENT NAME:     JAMES VALENZUELA                 
MEDICAL RECORD #: [id number]            OPERATIVE REPORT              
          ACCT #: [id number]  
DATE OF BIRTH:   [birth year]            REPORT #: [id number]      
PHYSICIAN:        TK LARRY MD                 
PCP:              ANDRES CHENEY MD                
REPORT IS CONFIDENTIAL AND NOT TO BE RELEASED WITHOUT AUTHORIZATION